# Patient Record
(demographics unavailable — no encounter records)

---

## 2024-10-28 NOTE — DISCUSSION/SUMMARY
[de-identified] :  Reviewed all X-ray images with patient, interpretation was provided. MRI of the left knee to evaluate for distal quad tear Physical therapy prescribed for strengthening and stretching. Rx naproxen Follow up after scan   **no sports gym, note provided  ----------------------------------------------- Home Exercise The patient is instructed on a home exercise program.  KATELIN LEW Acting as a Scribe for Dr. Prem BOWLING, Katelin Lew, attest that this documentation has been prepared under the direction and in the presence of Provider Daryn Amaro MD.  Activity Modification The patient was advised to modify their activities.  Dx / Natural History The patient was advised of the diagnosis.  The natural history of the pathology was explained in full to the patient in layman's terms.  Several different treatment options were discussed and explained in full to the patient including the risks and benefits of both surgical and non-surgical treatments.  All questions and concerns were answered.  Pain Guide Activities The patient was advised to let pain guide the gradual advancement of activities.  ZACHARIAH BOWLING explained to the patient that rest, ice, compression, and elevation would benefit them.  They may return to activity after follow-up or when they no longer have any pain.  The patient's current medication management of their orthopedic diagnosis was reviewed today: (1) We discussed a comprehensive treatment plan that included possible pharmaceutical management involving the use of prescription strength medications including but not limited to options such as oral Naprosyn 500mg BID, once daily Meloxicam 15 mg, or 500-650 mg Tylenol versus over the counter oral medications and topical prescription NSAID Pennsaid vs over the counter Voltaren gel. (2) There is a moderate risk of morbidity with further treatment, especially from use of prescription strength medications and possible side effects of these medications which include upset stomach with oral medications, skin reactions to topical medications and cardiac/renal issues with long term use. (3) I recommended that the patient follow-up with their medical physician to discuss any significant specific potential issues with long term medication use such as interactions with current medications or with exacerbation of underlying medical comorbidities. (4) The benefits and risks associated with use of injectable, oral or topical, prescription and over the counter anti-inflammatory medications were discussed with the patient. The patient voiced understanding of the risks including but not limited to bleeding, stroke, kidney dysfunction, heart disease, and were referred to the black box warning label for further information.

## 2024-10-28 NOTE — PHYSICAL EXAM
[de-identified] : Neurologic: normal sensation, normal mood and affect, orientated and able to communicate  Left Knee: Distal quadriceps tenderness No effusion NVI

## 2024-10-28 NOTE — HISTORY OF PRESENT ILLNESS
[de-identified] : The patient is a 15 year old right hand dominant male who presents today complaining of left knee.   Date of Injury/Onset: 10/15/24 Pain:    At Rest: 1/10  With Activity:  8/10  Mechanism of injury: Patient was playing football  on defense when someone fell in front of him, locking his left knee while falling forward  This is NOT a Work Related Injury being treated under Worker's Compensation. This is an athletic injury occurring associated with an interscholastic or organized sports team. 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

## 2024-10-28 NOTE — PHYSICAL EXAM
[de-identified] : Neurologic: normal sensation, normal mood and affect, orientated and able to communicate  Left Knee: Distal quadriceps tenderness No effusion NVI

## 2024-10-28 NOTE — DATA REVIEWED
[FreeTextEntry1] : 10/25/24 [facility] X-Ray Examination of the LEFT KNEE: 4 views: Patella pee, otherwise unremarkable

## 2024-10-28 NOTE — DISCUSSION/SUMMARY
[de-identified] :  Reviewed all X-ray images with patient, interpretation was provided. MRI of the left knee to evaluate for distal quad tear Physical therapy prescribed for strengthening and stretching. Rx naproxen Follow up after scan   **no sports gym, note provided  ----------------------------------------------- Home Exercise The patient is instructed on a home exercise program.  KATELIN LEW Acting as a Scribe for Dr. Prem BOWLING, Katelin Lew, attest that this documentation has been prepared under the direction and in the presence of Provider Daryn Amaro MD.  Activity Modification The patient was advised to modify their activities.  Dx / Natural History The patient was advised of the diagnosis.  The natural history of the pathology was explained in full to the patient in layman's terms.  Several different treatment options were discussed and explained in full to the patient including the risks and benefits of both surgical and non-surgical treatments.  All questions and concerns were answered.  Pain Guide Activities The patient was advised to let pain guide the gradual advancement of activities.  ZACHARIAH BOWLING explained to the patient that rest, ice, compression, and elevation would benefit them.  They may return to activity after follow-up or when they no longer have any pain.  The patient's current medication management of their orthopedic diagnosis was reviewed today: (1) We discussed a comprehensive treatment plan that included possible pharmaceutical management involving the use of prescription strength medications including but not limited to options such as oral Naprosyn 500mg BID, once daily Meloxicam 15 mg, or 500-650 mg Tylenol versus over the counter oral medications and topical prescription NSAID Pennsaid vs over the counter Voltaren gel. (2) There is a moderate risk of morbidity with further treatment, especially from use of prescription strength medications and possible side effects of these medications which include upset stomach with oral medications, skin reactions to topical medications and cardiac/renal issues with long term use. (3) I recommended that the patient follow-up with their medical physician to discuss any significant specific potential issues with long term medication use such as interactions with current medications or with exacerbation of underlying medical comorbidities. (4) The benefits and risks associated with use of injectable, oral or topical, prescription and over the counter anti-inflammatory medications were discussed with the patient. The patient voiced understanding of the risks including but not limited to bleeding, stroke, kidney dysfunction, heart disease, and were referred to the black box warning label for further information.

## 2024-10-28 NOTE — HISTORY OF PRESENT ILLNESS
[de-identified] : The patient is a 15 year old right hand dominant male who presents today complaining of left knee.   Date of Injury/Onset: 10/15/24 Pain:    At Rest: 1/10  With Activity:  8/10  Mechanism of injury: Patient was playing football  on defense when someone fell in front of him, locking his left knee while falling forward  This is NOT a Work Related Injury being treated under Worker's Compensation. This is an athletic injury occurring associated with an interscholastic or organized sports team. Quality of symptoms: grinding, discomfort, locking, stabbing, throbbing  Improves with: brace, lido patch, IBUPROFEN, ADVIL  Worse with: stairs, weight bearing, walking, laying down  Prior treatment: crutches,  Prior Imaging: not with the patient - done at another   Out of work/sport: for 1 week until yesterday  School/Sport/Position/Occupation: 10th @ [employer], football, wrestling, and lacrosse  Additional Information: patient didn't play for 1 week, returned to play last night and re-injured his left knee

## 2024-10-30 NOTE — HISTORY OF PRESENT ILLNESS
[de-identified] : The patient is a 15 year old right hand dominant male who presents today complaining of left knee.   Date of Injury/Onset: 10/15/24 Pain:    At Rest: 1/10  With Activity:  3/10  Mechanism of injury: Patient was playing football  on defense when someone fell in front of him, locking his left knee while falling forward  This is NOT a Work Related Injury being treated under Worker's Compensation. This is an athletic injury occurring associated with an interscholastic or organized sports team. Quality of symptoms: popping/loose, throbbing, discomfort Improves with: brace, lido patch, IBUPROFEN, ADVIL  Worse with: stairs, weight bearing, walking, laying down  Treatment/imaging since last visit: MRI @  Out of work/sport: for 1 week until yesterday  School/Sport/Position/Occupation: [employer] @ [employer], football, wrestling, and lacrosse  Changes since last visit: MRI, has not done PT yet  Additional Information: none

## 2024-10-30 NOTE — DATA REVIEWED
[FreeTextEntry1] : 10/25/24 [facility] X-Ray Examination of the LEFT KNEE: 4 views: Patella pee, otherwise unremarkable  10/27/24 [facility] MRI Left Knee: This scan was reviewed and interpreted by Dr. Amaro, and his findings are- Impression: Mild strain of the far distal aspect of the vastus medialis muscle belly abutting the distal quadriceps insertion. There are no retracted muscle fibers. There is no quadriceps tendon tear. There is mild overlying soft tissue edema.  No meniscal tear, ligament injury or cartilage defect.

## 2024-10-30 NOTE — PHYSICAL EXAM
[de-identified] : Neurologic: normal mood and affect, orientated and able to communicate Constitutional: well developed and well nourished  Left Knee: Full ROM Ligamental stable Nontender no effusion

## 2024-10-30 NOTE — ADDENDUM
[FreeTextEntry1] : Documented by Dev Call acting as a scribe for Dr. Amaro and Robson Sweeney PA-C on 10/29/2024 and was presence for the following sections: Physical Exam; Data Reviewed; Assessment; Discussion/Summary. All medical record entries made by the Scribe were at my, Dr. Amaro, and Robson Sweeney, direction and personally dictated by me on 10/29/2024. I have reviewed the chart and agree that the record accurately reflects my personal performance of the history, physical exam, procedure and imaging.

## 2024-10-30 NOTE — HISTORY OF PRESENT ILLNESS
[de-identified] : The patient is a 15 year old right hand dominant male who presents today complaining of left knee.   Date of Injury/Onset: 10/15/24 Pain:    At Rest: 1/10  With Activity:  3/10  Mechanism of injury: Patient was playing football  on defense when someone fell in front of him, locking his left knee while falling forward  This is NOT a Work Related Injury being treated under Worker's Compensation. This is an athletic injury occurring associated with an interscholastic or organized sports team. Quality of symptoms: popping/loose, throbbing, discomfort Improves with: brace, lido patch, IBUPROFEN, ADVIL  Worse with: stairs, weight bearing, walking, laying down  Treatment/imaging since last visit: MRI @  Out of work/sport: for 1 week until yesterday  School/Sport/Position/Occupation: [employer] @ [employer], football, wrestling, and lacrosse  Changes since last visit: MRI, has not done PT yet  Additional Information: none

## 2024-10-30 NOTE — DISCUSSION/SUMMARY
[de-identified] : Reviewed all MRI images with patient today and interpretation was provided. Patient cleared for all sport and activity. Patient will follow up as needed.     ----------------------------------------------- Home Exercise The patient is instructed on a home exercise program.  DAMASO SCHUSTER Acting as a Scribe for Dr. Prem BOWLING, Damaso Schuster, attest that this documentation has been prepared under the direction and in the presence of Provider Dr. Amaro.  Activity Modification The patient was advised to modify their activities.  Dx / Natural History The patient was advised of the diagnosis. The natural history of the pathology was explained in full to the patient in layman's terms. Several different treatment options were discussed and explained in full to the patient including the risks and benefits of both surgical and non-surgical treatments.  All questions and concerns were answered.  Pain Guide Activities The patient was advised to let pain guide the gradual advancement of activities.  RICE I explained to the patient that rest, ice, compression, and elevation would benefit them. They may return to activity after follow-up or when they no longer have any pain.  The patient's current medication management of their orthopedic diagnosis was reviewed today: (1) We discussed a comprehensive treatment plan that included possible pharmaceutical management involving the use of prescription strength medications including but not limited to options such as oral Naprosyn 500mg BID, once daily Meloxicam 15 mg, or 500-650 mg Tylenol versus over the counter oral medications and topical prescription NSAID Pennsaid vs over the counter Voltaren gel. (2) There is a moderate risk of morbidity with further treatment, especially from use of prescription strength medications and possible side effects of these medications which include upset stomach with oral medications, skin reactions to topical medications and cardiac/renal issues with long term use. (3) I recommended that the patient follow-up with their medical physician to discuss any significant specific potential issues with long term medication use such as interactions with current medications or with exacerbation of underlying medical comorbidities. (4) The benefits and risks associated with use of injectable, oral or topical, prescription and over the counter anti-inflammatory medications were discussed with the patient. The patient voiced understanding of the risks including but not limited to bleeding, stroke, kidney dysfunction, heart disease, and were referred to the black box warning label for further information.

## 2024-10-30 NOTE — DISCUSSION/SUMMARY
[de-identified] : Reviewed all MRI images with patient today and interpretation was provided. Patient cleared for all sport and activity. Patient will follow up as needed.     ----------------------------------------------- Home Exercise The patient is instructed on a home exercise program.  DAMASO SCHUSTER Acting as a Scribe for Dr. Prem BOWLING, Damaso Schuster, attest that this documentation has been prepared under the direction and in the presence of Provider Dr. Amaro.  Activity Modification The patient was advised to modify their activities.  Dx / Natural History The patient was advised of the diagnosis. The natural history of the pathology was explained in full to the patient in layman's terms. Several different treatment options were discussed and explained in full to the patient including the risks and benefits of both surgical and non-surgical treatments.  All questions and concerns were answered.  Pain Guide Activities The patient was advised to let pain guide the gradual advancement of activities.  RICE I explained to the patient that rest, ice, compression, and elevation would benefit them. They may return to activity after follow-up or when they no longer have any pain.  The patient's current medication management of their orthopedic diagnosis was reviewed today: (1) We discussed a comprehensive treatment plan that included possible pharmaceutical management involving the use of prescription strength medications including but not limited to options such as oral Naprosyn 500mg BID, once daily Meloxicam 15 mg, or 500-650 mg Tylenol versus over the counter oral medications and topical prescription NSAID Pennsaid vs over the counter Voltaren gel. (2) There is a moderate risk of morbidity with further treatment, especially from use of prescription strength medications and possible side effects of these medications which include upset stomach with oral medications, skin reactions to topical medications and cardiac/renal issues with long term use. (3) I recommended that the patient follow-up with their medical physician to discuss any significant specific potential issues with long term medication use such as interactions with current medications or with exacerbation of underlying medical comorbidities. (4) The benefits and risks associated with use of injectable, oral or topical, prescription and over the counter anti-inflammatory medications were discussed with the patient. The patient voiced understanding of the risks including but not limited to bleeding, stroke, kidney dysfunction, heart disease, and were referred to the black box warning label for further information.

## 2024-10-30 NOTE — PHYSICAL EXAM
[de-identified] : Neurologic: normal mood and affect, orientated and able to communicate Constitutional: well developed and well nourished  Left Knee: Full ROM Ligamental stable Nontender no effusion

## 2024-11-18 NOTE — HISTORY OF PRESENT ILLNESS
[de-identified] : mom reports pt w runny nose and congestion x1 week, pt w L ear pain today, +UO, appetite OK, mom denies fever, NVD, SOB, dc from ear [FreeTextEntry6] : Runny nose and congestion x 1 week Left ear pain started today No fever. No SOB, difficulty breathing, chest pain, wheeze or stridor. No nausea, vomiting, diarrhea No headache, sore throat, abdominal pain, rash. No body aches or fatigue. Good po/urine output/bm. Normal sleep and activity No sick contacts

## 2024-11-18 NOTE — PHYSICAL EXAM
[Clear Rhinorrhea] : clear rhinorrhea [Inflamed Nasal Mucosa] : inflamed nasal mucosa [NL] : warm, clear [Clear] : left tympanic membrane not clear [Erythema] : no erythema [Bulging] : not bulging [Wheezing] : no wheezing [Rales] : no rales [Tachypnea] : no tachypnea [Rhonchi] : no rhonchi

## 2024-11-18 NOTE — REVIEW OF SYSTEMS
[Ear Pain] : ear pain [Nasal Discharge] : nasal discharge [Nasal Congestion] : nasal congestion [Congestion] : congestion [Negative] : Genitourinary [Fever] : no fever [Tachypnea] : not tachypneic [Wheezing] : no wheezing [Cough] : no cough [Shortness of Breath] : no shortness of breath

## 2024-11-18 NOTE — DISCUSSION/SUMMARY
[FreeTextEntry1] : Discussed with parent diagnosis of left otitis media in setting of URI Complete antibiotic course. Potential side effect of antibiotics includes but not limited to diarrhea. Give probiotics Provide Tylenol or Motrin as needed for pain or fever, increased fluids, cool mist humidifier, nasal saline rinses Can try OTC antihistamine (Zyrtec) and Flonase to see if improvement in congestion If no improvement within 48 hours return for re-evaluation or if symptoms return after completion of antibiotic

## 2025-04-29 NOTE — PHYSICAL EXAM
[NL] : warm, clear [FreeTextEntry2] : no bruising/swelling or step off areas to scalp [de-identified] : CN 2-12 intact, 5/5 MS, cerebellar signs intact, normal gait, balance intact, sensation intact

## 2025-04-29 NOTE — REVIEW OF SYSTEMS
[Difficulty with Sleep] : no difficulty with sleep [Headache] : no headache [Changes in Vision] : no changes in vision [Appetite Changes] : no appetite changes [Vomiting] : no vomiting [Dizziness] : no dizziness
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
